# Patient Record
Sex: MALE | Race: ASIAN | NOT HISPANIC OR LATINO | URBAN - METROPOLITAN AREA
[De-identification: names, ages, dates, MRNs, and addresses within clinical notes are randomized per-mention and may not be internally consistent; named-entity substitution may affect disease eponyms.]

---

## 2018-05-22 ENCOUNTER — EMERGENCY (EMERGENCY)
Facility: HOSPITAL | Age: 78
LOS: 1 days | Discharge: HOME | End: 2018-05-22
Attending: EMERGENCY MEDICINE | Admitting: EMERGENCY MEDICINE
Payer: MEDICAID

## 2018-05-22 VITALS
HEART RATE: 78 BPM | RESPIRATION RATE: 16 BRPM | SYSTOLIC BLOOD PRESSURE: 153 MMHG | TEMPERATURE: 98 F | OXYGEN SATURATION: 100 % | DIASTOLIC BLOOD PRESSURE: 72 MMHG

## 2018-05-22 VITALS
SYSTOLIC BLOOD PRESSURE: 136 MMHG | HEART RATE: 71 BPM | TEMPERATURE: 98 F | OXYGEN SATURATION: 96 % | DIASTOLIC BLOOD PRESSURE: 67 MMHG | RESPIRATION RATE: 17 BRPM

## 2018-05-22 DIAGNOSIS — I62.01 NONTRAUMATIC ACUTE SUBDURAL HEMORRHAGE: Chronic | ICD-10-CM

## 2018-05-22 LAB
BASOPHILS # BLD AUTO: 0.01 K/UL — SIGNIFICANT CHANGE UP (ref 0–0.2)
BASOPHILS NFR BLD AUTO: 0.2 % — SIGNIFICANT CHANGE UP (ref 0–2)
BUN SERPL-MCNC: 11 MG/DL — SIGNIFICANT CHANGE UP (ref 7–23)
CALCIUM SERPL-MCNC: 8.9 MG/DL — SIGNIFICANT CHANGE UP (ref 8.4–10.5)
CHLORIDE SERPL-SCNC: 94 MMOL/L — LOW (ref 98–107)
CO2 SERPL-SCNC: 26 MMOL/L — SIGNIFICANT CHANGE UP (ref 22–31)
CREAT SERPL-MCNC: 0.86 MG/DL — SIGNIFICANT CHANGE UP (ref 0.5–1.3)
EOSINOPHIL # BLD AUTO: 0.12 K/UL — SIGNIFICANT CHANGE UP (ref 0–0.5)
EOSINOPHIL NFR BLD AUTO: 2.1 % — SIGNIFICANT CHANGE UP (ref 0–6)
GLUCOSE SERPL-MCNC: 126 MG/DL — HIGH (ref 70–99)
HCT VFR BLD CALC: 31.6 % — LOW (ref 39–50)
HGB BLD-MCNC: 10.4 G/DL — LOW (ref 13–17)
IMM GRANULOCYTES # BLD AUTO: 0.01 # — SIGNIFICANT CHANGE UP
IMM GRANULOCYTES NFR BLD AUTO: 0.2 % — SIGNIFICANT CHANGE UP (ref 0–1.5)
LYMPHOCYTES # BLD AUTO: 1.79 K/UL — SIGNIFICANT CHANGE UP (ref 1–3.3)
LYMPHOCYTES # BLD AUTO: 31.1 % — SIGNIFICANT CHANGE UP (ref 13–44)
MCHC RBC-ENTMCNC: 29.4 PG — SIGNIFICANT CHANGE UP (ref 27–34)
MCHC RBC-ENTMCNC: 32.9 % — SIGNIFICANT CHANGE UP (ref 32–36)
MCV RBC AUTO: 89.3 FL — SIGNIFICANT CHANGE UP (ref 80–100)
MONOCYTES # BLD AUTO: 0.43 K/UL — SIGNIFICANT CHANGE UP (ref 0–0.9)
MONOCYTES NFR BLD AUTO: 7.5 % — SIGNIFICANT CHANGE UP (ref 2–14)
NEUTROPHILS # BLD AUTO: 3.4 K/UL — SIGNIFICANT CHANGE UP (ref 1.8–7.4)
NEUTROPHILS NFR BLD AUTO: 58.9 % — SIGNIFICANT CHANGE UP (ref 43–77)
NRBC # FLD: 0 — SIGNIFICANT CHANGE UP
PLATELET # BLD AUTO: 181 K/UL — SIGNIFICANT CHANGE UP (ref 150–400)
PMV BLD: 9.5 FL — SIGNIFICANT CHANGE UP (ref 7–13)
POTASSIUM SERPL-MCNC: 4.4 MMOL/L — SIGNIFICANT CHANGE UP (ref 3.5–5.3)
POTASSIUM SERPL-SCNC: 4.4 MMOL/L — SIGNIFICANT CHANGE UP (ref 3.5–5.3)
RBC # BLD: 3.54 M/UL — LOW (ref 4.2–5.8)
RBC # FLD: 13.1 % — SIGNIFICANT CHANGE UP (ref 10.3–14.5)
SODIUM SERPL-SCNC: 132 MMOL/L — LOW (ref 135–145)
WBC # BLD: 5.76 K/UL — SIGNIFICANT CHANGE UP (ref 3.8–10.5)
WBC # FLD AUTO: 5.76 K/UL — SIGNIFICANT CHANGE UP (ref 3.8–10.5)

## 2018-05-22 PROCEDURE — 70486 CT MAXILLOFACIAL W/O DYE: CPT | Mod: 26

## 2018-05-22 PROCEDURE — 99284 EMERGENCY DEPT VISIT MOD MDM: CPT

## 2018-05-22 PROCEDURE — 70450 CT HEAD/BRAIN W/O DYE: CPT | Mod: 26

## 2018-05-22 PROCEDURE — 72125 CT NECK SPINE W/O DYE: CPT | Mod: 26

## 2018-05-22 NOTE — ED ADULT TRIAGE NOTE - CHIEF COMPLAINT QUOTE
[place] on Friday, c/o nose pain , intermittent dizziness and headache .   Denies nausea.  Swelling noted under both eyes and bruising to nose

## 2018-05-22 NOTE — ED PROVIDER NOTE - PLAN OF CARE
During your ED visit you were evaluated for a fall. You had ct scans , the results were provided to you. Take tylenol 325mg every 4-6 hours as needed for pain. Return to the ED if you exhibit any new, continued or worsening symptoms.

## 2018-05-22 NOTE — ED PROVIDER NOTE - PHYSICAL EXAMINATION
Facial bruising over nasal bridge, below both eyes. No nasal septal hemtoma. No scalp tenderness, Nml TM, normal neuro exam, no midline tenderness

## 2018-05-22 NOTE — ED PROVIDER NOTE - CARE PLAN
Principal Discharge DX:	Facial hematoma Principal Discharge DX:	Facial hematoma  Assessment and plan of treatment:	During your ED visit you were evaluated for a fall. You had ct scans , the results were provided to you. Take tylenol 325mg every 4-6 hours as needed for pain. Return to the ED if you exhibit any new, continued or worsening symptoms.

## 2018-05-22 NOTE — ED ADULT NURSE NOTE - OBJECTIVE STATEMENT
78 y/o male resents to ED s/p fall.  Pt states that he fell face first on the ground Friday, unknown LOC but pt states that he remembers falling.  Pt states that he felt OK initially and went away this weekend with family but started to notice worsening nasal bridge swelling and ecchymosis under both eyes and came to ED for eval.  Pt awake alert oriented, ambulatory with steady gait, BARAKAT x 4, no n/v, +mild facial pain.  Pt offers no other complaints.  IV established labs drawn and sent, pt awaiting CT scan, family at bedside, will cont to monitor.
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
## 2018-05-22 NOTE — ED PROVIDER NOTE - ATTENDING CONTRIBUTION TO CARE
77M on aspirin presents from home with family requesting head CT for recent fall, +head injury, bruising to nasal bridge and under eyes, slight headache. Regular diet, good energy level. Ambulatory. Baseline mental status.    Patient denies vision changes, focal weakness/numbness, neck pain, fever, cough, chest pain, shortness of breath, back pain, abd pain, nausea, vomiting, diarrhea, constipation, blood in stool, dysuria, rash. Patient is well appearing, conversant, cooperative, smiling, ecchymosis at nasal bridge and infraorbitally, neck supple with full range of motion, no spinal tenderness, oropharynx clear, lungs clear, no crackles or rales, speaking full sentences, heart clear, no murmurs, distal pulses equal in all 4 extremities, abdomen soft nontender nondistended with no masses, no leg edema, no calf tenderness, nonfocal neurologic exam, CN 2-12 intact. Patient stable. CT to r/o traumatic injury. Reassess.

## 2019-02-27 NOTE — ED PROVIDER NOTE - EYES, MLM
Nurse reviewed recommendations. No additional questions at this time.    Clear bilaterally, pupils equal, round and reactive to light.

## 2024-02-27 NOTE — ED PROVIDER NOTE - SKIN COLOR
Received patient in signout from Dr. Ross. In short the patient is a 74 y.o. male presenting with chest pain, abdominal pain. Of note patient has been seen for chest pain multiple times in the ed over the last few months, symptoms described by patient seems similar to prior presentations. Does endorse abdominal pain today as well, on exam patient with mild lower abdominal tenderness but otherwise unremarkable. In the ED, ECG, troponins showing no evidence of AMI. CT showing evidence of diverticulitis. On reassessment patient sleeping comfortably, he wakes easily to voice, overall is very comfortable appearing. Given diverticulitis on CT will treat with oral abx as an outpatient. Patient discharged in good condition.    
normal for race

## 2025-05-27 NOTE — ED PROVIDER NOTE - AGGRAVATING FACTORS
Patient has a referral to see Neurology, please let [facility] know which Doctor would be appropriate to schedule with.  Diag:    G43.809 (ICD-10-CM) - Vestibular migraine   R27.0 (ICD-10-CM) - Ataxia   R42 (ICD-10-CM) - Vertigo   R29.818 (ICD-10-CM) - Transient neurological symptoms   R90.89 (ICD-10-CM) - Abnormal finding on MRI of brain   G43.009 (ICD-10-CM) - Migraine without aura and without status migrainosus, not intractable   H51.8 (ICD-10-CM) - Dysconjugate gaze   H53.2 (ICD-10-CM) - Diplopia   R53.81, R53.83 (ICD-10-CM) - Malaise and fatigue     
none